# Patient Record
Sex: FEMALE | Race: BLACK OR AFRICAN AMERICAN | Employment: FULL TIME | URBAN - METROPOLITAN AREA
[De-identification: names, ages, dates, MRNs, and addresses within clinical notes are randomized per-mention and may not be internally consistent; named-entity substitution may affect disease eponyms.]

---

## 2019-11-11 ENCOUNTER — OFFICE VISIT (OUTPATIENT)
Dept: SURGERY | Age: 39
End: 2019-11-11

## 2019-11-11 VITALS
HEART RATE: 81 BPM | OXYGEN SATURATION: 99 % | RESPIRATION RATE: 16 BRPM | TEMPERATURE: 97.5 F | SYSTOLIC BLOOD PRESSURE: 132 MMHG | DIASTOLIC BLOOD PRESSURE: 82 MMHG | BODY MASS INDEX: 25.61 KG/M2 | HEIGHT: 64 IN | WEIGHT: 150 LBS

## 2019-11-11 DIAGNOSIS — K64.2 GRADE III HEMORRHOIDS: Primary | ICD-10-CM

## 2019-11-11 RX ORDER — ESCITALOPRAM OXALATE 20 MG/1
TABLET ORAL DAILY
Refills: 0 | COMMUNITY
Start: 2019-10-31

## 2019-11-11 RX ORDER — NORGESTREL AND ETHINYL ESTRADIOL 0.3-0.03MG
KIT ORAL
Refills: 0 | COMMUNITY
Start: 2019-11-07 | End: 2020-01-02

## 2019-11-11 NOTE — LETTER
11/11/19 Patient: Luis Alberto Potts YOB: 1980 Date of Visit: 11/11/2019 Brooks Fenton MD 
[address] [facility] [address] [place] [id number] VIA Facsimile: [phone] Jina Leger MD 
[address] [address] [address] [place] [address] VIA Facsimile: [phone] Dear Johnny Peace, I saw Pati Aguilar in the office today for her hemorrhoids. They prolapse significantly and require manual reduction. On exam she has a very large prolapsed anterior hemorrhoid and a smaller hemorrhoid in the right posterior position. She has been on a fiber powder for 2 to 3 months with minimal benefit. She would like to proceed with hemorrhoidectomy and I think this is reasonable. If you have questions, please do not hesitate to call me. I look forward to following your patient along with you. Sincerely, Bc Quinn MD

## 2019-11-11 NOTE — PROGRESS NOTES
HPI: Helen Hamilton is a 35 y.o. female presenting with chief complain of hemorrhoids. She has had hemorrhoid issues for years. More recently they have prolapse quite significantly they require manual reduction. She sees blood on toilet paper when wiping. She has pain. She had a flex will sigmoidoscopy where one polyp was identified. She has bowel movements every other day and has constipation. She takes Metamucil and has done so over the last 2 to 3 months with some benefit. She denies fecal incontinence but has occasional incontinence to flatus. Past medical history: Negative    Past Surgical History:   Procedure Laterality Date    HX DILATION AND CURETTAGE         Family medical history: Negative for colorectal disorders    Social History     Socioeconomic History    Marital status: SINGLE     Spouse name: Not on file    Number of children: Not on file    Years of education: Not on file    Highest education level: Not on file   Tobacco Use    Smoking status: Current Some Day Smoker    Smokeless tobacco: Never Used   Substance and Sexual Activity    Alcohol use: Yes       Review of Systems - Review of Systems   Constitutional: Positive for diaphoresis and malaise/fatigue. Negative for chills, fever and weight loss. HENT: Positive for congestion. Negative for ear discharge, ear pain, hearing loss, nosebleeds, sinus pain, sore throat and tinnitus. Eyes: Negative. Respiratory: Positive for cough. Negative for hemoptysis, sputum production, shortness of breath, wheezing and stridor. Cardiovascular: Negative. Gastrointestinal: Positive for nausea. Negative for abdominal pain, blood in stool, constipation, diarrhea, heartburn, melena and vomiting. With migraines   Genitourinary: Negative. Musculoskeletal: Negative. Skin: Positive for itching. Negative for rash. rectal   Neurological: Positive for headaches.  Negative for dizziness, tingling, tremors, sensory change, speech change, focal weakness, seizures, loss of consciousness and weakness. Endo/Heme/Allergies: Negative. Psychiatric/Behavioral: Negative. Outpatient Medications Marked as Taking for the 11/11/19 encounter (Office Visit) with Fabrizio Whaley MD   Medication Sig Dispense Refill    escitalopram oxalate (LEXAPRO) 20 mg tablet   0    ELINEST 0.3-30 mg-mcg tab   0       No Known Allergies    Vitals:    11/11/19 1352   Resp: 16   Weight: 68 kg (150 lb)   Height: 5' 4\" (1.626 m)   PainSc:   0 - No pain       Physical Exam   Constitutional: She appears well-developed and well-nourished. HENT:   Head: Normocephalic and atraumatic. Eyes: Conjunctivae and EOM are normal.   Abdominal: Soft. She exhibits no distension. There is no tenderness. Musculoskeletal: Normal range of motion. Lymphadenopathy:     She has no cervical adenopathy. Right: No inguinal adenopathy present. Left: No inguinal adenopathy present. Neurological: No sensory deficit. Skin: Skin is warm and dry. No rash noted. Psychiatric: She has a normal mood and affect. Her speech is normal.   Rectum: Prolapsed anterior hemorrhoid, smaller right posterior external hemorrhoid  Digital rectal exam: Good tone, no mass  Anoscopy: Enlarged internal hemorrhoids in anterior and right posterior positions    Assessment / Plan    Grade 3 hemorrhoids  Schedule hemorrhoidectomy  I told patient that her incontinence to flatus might improve or worsen with surgery and that prolapse can sometimes impede continence  I told her there is a small risk of fecal leakage after surgery  Patient understands these risks and is willing to proceed    The diagnoses and plan were discussed with the patient. All questions answered. Plan of care agreed to by all concerned.

## 2020-01-02 RX ORDER — NORETHINDRONE ACETATE/ETHINYL ESTRADIOL 1MG-20MCG
KIT ORAL
Refills: 1 | COMMUNITY
Start: 2019-10-14

## 2020-01-06 ENCOUNTER — ANESTHESIA EVENT (OUTPATIENT)
Dept: CARDIOTHORACIC SURGERY | Age: 40
End: 2020-01-06
Payer: COMMERCIAL

## 2020-01-07 ENCOUNTER — ANESTHESIA (OUTPATIENT)
Dept: CARDIOTHORACIC SURGERY | Age: 40
End: 2020-01-07
Payer: COMMERCIAL

## 2020-01-07 ENCOUNTER — HOSPITAL ENCOUNTER (OUTPATIENT)
Age: 40
Setting detail: OUTPATIENT SURGERY
Discharge: HOME | End: 2020-01-07
Attending: COLON & RECTAL SURGERY | Admitting: COLON & RECTAL SURGERY
Payer: COMMERCIAL

## 2020-01-07 VITALS
HEART RATE: 62 BPM | TEMPERATURE: 98.1 F | RESPIRATION RATE: 16 BRPM | BODY MASS INDEX: 25.84 KG/M2 | SYSTOLIC BLOOD PRESSURE: 149 MMHG | WEIGHT: 151.38 LBS | DIASTOLIC BLOOD PRESSURE: 88 MMHG | OXYGEN SATURATION: 97 % | HEIGHT: 64 IN

## 2020-01-07 DIAGNOSIS — K64.2 GRADE III HEMORRHOIDS: Primary | ICD-10-CM

## 2020-01-07 LAB
AMPHET UR QL SCN: NEGATIVE
BARBITURATES UR QL SCN: NEGATIVE
BENZODIAZ UR QL: NEGATIVE
CANNABINOIDS UR QL SCN: NEGATIVE
COCAINE UR QL SCN: NEGATIVE
HCG UR QL: NEGATIVE
HCG UR QL: NEGATIVE
HDSCOM,HDSCOM: NORMAL
METHADONE UR QL: NEGATIVE
OPIATES UR QL: NEGATIVE
PCP UR QL: NEGATIVE

## 2020-01-07 PROCEDURE — 88304 TISSUE EXAM BY PATHOLOGIST: CPT

## 2020-01-07 PROCEDURE — 76210000021 HC REC RM PH II 0.5 TO 1 HR: Performed by: COLON & RECTAL SURGERY

## 2020-01-07 PROCEDURE — 77030018836 HC SOL IRR NACL ICUM -A: Performed by: COLON & RECTAL SURGERY

## 2020-01-07 PROCEDURE — 76010000138 HC OR TIME 0.5 TO 1 HR: Performed by: COLON & RECTAL SURGERY

## 2020-01-07 PROCEDURE — 74011000250 HC RX REV CODE- 250: Performed by: NURSE ANESTHETIST, CERTIFIED REGISTERED

## 2020-01-07 PROCEDURE — 76060000032 HC ANESTHESIA 0.5 TO 1 HR: Performed by: COLON & RECTAL SURGERY

## 2020-01-07 PROCEDURE — 74011250636 HC RX REV CODE- 250/636: Performed by: NURSE ANESTHETIST, CERTIFIED REGISTERED

## 2020-01-07 PROCEDURE — 77030040361 HC SLV COMPR DVT MDII -B: Performed by: COLON & RECTAL SURGERY

## 2020-01-07 PROCEDURE — 74011000250 HC RX REV CODE- 250: Performed by: COLON & RECTAL SURGERY

## 2020-01-07 PROCEDURE — 80307 DRUG TEST PRSMV CHEM ANLYZR: CPT

## 2020-01-07 PROCEDURE — 77030040922 HC BLNKT HYPOTHRM STRY -A: Performed by: COLON & RECTAL SURGERY

## 2020-01-07 PROCEDURE — 77030031139 HC SUT VCRL2 J&J -A: Performed by: COLON & RECTAL SURGERY

## 2020-01-07 PROCEDURE — 81025 URINE PREGNANCY TEST: CPT

## 2020-01-07 PROCEDURE — 76210000006 HC OR PH I REC 0.5 TO 1 HR: Performed by: COLON & RECTAL SURGERY

## 2020-01-07 PROCEDURE — 74011250636 HC RX REV CODE- 250/636

## 2020-01-07 PROCEDURE — 74011250637 HC RX REV CODE- 250/637: Performed by: NURSE ANESTHETIST, CERTIFIED REGISTERED

## 2020-01-07 RX ORDER — OXYCODONE AND ACETAMINOPHEN 5; 325 MG/1; MG/1
1 TABLET ORAL
Qty: 40 TAB | Refills: 0 | Status: SHIPPED | OUTPATIENT
Start: 2020-01-07 | End: 2020-01-14

## 2020-01-07 RX ORDER — NALOXONE HYDROCHLORIDE 0.4 MG/ML
0.1 INJECTION, SOLUTION INTRAMUSCULAR; INTRAVENOUS; SUBCUTANEOUS AS NEEDED
Status: DISCONTINUED | OUTPATIENT
Start: 2020-01-07 | End: 2020-01-09 | Stop reason: HOSPADM

## 2020-01-07 RX ORDER — FAMOTIDINE 20 MG/1
20 TABLET, FILM COATED ORAL ONCE
Status: COMPLETED | OUTPATIENT
Start: 2020-01-07 | End: 2020-01-07

## 2020-01-07 RX ORDER — LIDOCAINE HYDROCHLORIDE 10 MG/ML
INJECTION, SOLUTION EPIDURAL; INFILTRATION; INTRACAUDAL; PERINEURAL
Status: DISCONTINUED
Start: 2020-01-07 | End: 2020-01-07 | Stop reason: HOSPADM

## 2020-01-07 RX ORDER — SODIUM CHLORIDE 0.9 % (FLUSH) 0.9 %
5-40 SYRINGE (ML) INJECTION AS NEEDED
Status: DISCONTINUED | OUTPATIENT
Start: 2020-01-07 | End: 2020-01-07 | Stop reason: HOSPADM

## 2020-01-07 RX ORDER — HYDROMORPHONE HYDROCHLORIDE 2 MG/ML
0.5 INJECTION, SOLUTION INTRAMUSCULAR; INTRAVENOUS; SUBCUTANEOUS
Status: DISCONTINUED | OUTPATIENT
Start: 2020-01-07 | End: 2020-01-09 | Stop reason: HOSPADM

## 2020-01-07 RX ORDER — SODIUM CHLORIDE, SODIUM LACTATE, POTASSIUM CHLORIDE, CALCIUM CHLORIDE 600; 310; 30; 20 MG/100ML; MG/100ML; MG/100ML; MG/100ML
25 INJECTION, SOLUTION INTRAVENOUS CONTINUOUS
Status: DISCONTINUED | OUTPATIENT
Start: 2020-01-07 | End: 2020-01-07 | Stop reason: HOSPADM

## 2020-01-07 RX ORDER — FENTANYL CITRATE 50 UG/ML
50 INJECTION, SOLUTION INTRAMUSCULAR; INTRAVENOUS AS NEEDED
Status: DISCONTINUED | OUTPATIENT
Start: 2020-01-07 | End: 2020-01-09 | Stop reason: HOSPADM

## 2020-01-07 RX ORDER — LIDOCAINE HYDROCHLORIDE 10 MG/ML
0.1 INJECTION, SOLUTION EPIDURAL; INFILTRATION; INTRACAUDAL; PERINEURAL AS NEEDED
Status: DISCONTINUED | OUTPATIENT
Start: 2020-01-07 | End: 2020-01-07 | Stop reason: HOSPADM

## 2020-01-07 RX ORDER — LIDOCAINE HYDROCHLORIDE 20 MG/ML
INJECTION, SOLUTION EPIDURAL; INFILTRATION; INTRACAUDAL; PERINEURAL AS NEEDED
Status: DISCONTINUED | OUTPATIENT
Start: 2020-01-07 | End: 2020-01-07 | Stop reason: HOSPADM

## 2020-01-07 RX ORDER — DIPHENHYDRAMINE HYDROCHLORIDE 50 MG/ML
12.5 INJECTION, SOLUTION INTRAMUSCULAR; INTRAVENOUS
Status: DISCONTINUED | OUTPATIENT
Start: 2020-01-07 | End: 2020-01-09 | Stop reason: HOSPADM

## 2020-01-07 RX ORDER — SODIUM CHLORIDE 0.9 % (FLUSH) 0.9 %
5-40 SYRINGE (ML) INJECTION EVERY 8 HOURS
Status: DISCONTINUED | OUTPATIENT
Start: 2020-01-07 | End: 2020-01-07 | Stop reason: HOSPADM

## 2020-01-07 RX ORDER — ONDANSETRON 2 MG/ML
INJECTION INTRAMUSCULAR; INTRAVENOUS
Status: COMPLETED
Start: 2020-01-07 | End: 2020-01-07

## 2020-01-07 RX ORDER — ONDANSETRON 2 MG/ML
4 INJECTION INTRAMUSCULAR; INTRAVENOUS ONCE
Status: COMPLETED | OUTPATIENT
Start: 2020-01-07 | End: 2020-01-07

## 2020-01-07 RX ORDER — SODIUM CHLORIDE 0.9 % (FLUSH) 0.9 %
5-40 SYRINGE (ML) INJECTION EVERY 8 HOURS
Status: DISCONTINUED | OUTPATIENT
Start: 2020-01-07 | End: 2020-01-09 | Stop reason: HOSPADM

## 2020-01-07 RX ORDER — SODIUM CHLORIDE 0.9 % (FLUSH) 0.9 %
5-40 SYRINGE (ML) INJECTION AS NEEDED
Status: DISCONTINUED | OUTPATIENT
Start: 2020-01-07 | End: 2020-01-09 | Stop reason: HOSPADM

## 2020-01-07 RX ORDER — KETAMINE HCL 50MG/ML(1)
SYRINGE (ML) INTRAVENOUS AS NEEDED
Status: DISCONTINUED | OUTPATIENT
Start: 2020-01-07 | End: 2020-01-07 | Stop reason: HOSPADM

## 2020-01-07 RX ORDER — SODIUM CHLORIDE, SODIUM LACTATE, POTASSIUM CHLORIDE, CALCIUM CHLORIDE 600; 310; 30; 20 MG/100ML; MG/100ML; MG/100ML; MG/100ML
INJECTION, SOLUTION INTRAVENOUS
Status: DISCONTINUED | OUTPATIENT
Start: 2020-01-07 | End: 2020-01-07 | Stop reason: HOSPADM

## 2020-01-07 RX ORDER — MIDAZOLAM HYDROCHLORIDE 1 MG/ML
INJECTION, SOLUTION INTRAMUSCULAR; INTRAVENOUS AS NEEDED
Status: DISCONTINUED | OUTPATIENT
Start: 2020-01-07 | End: 2020-01-07 | Stop reason: HOSPADM

## 2020-01-07 RX ORDER — SODIUM CHLORIDE, SODIUM LACTATE, POTASSIUM CHLORIDE, CALCIUM CHLORIDE 600; 310; 30; 20 MG/100ML; MG/100ML; MG/100ML; MG/100ML
50 INJECTION, SOLUTION INTRAVENOUS CONTINUOUS
Status: DISCONTINUED | OUTPATIENT
Start: 2020-01-07 | End: 2020-01-09 | Stop reason: HOSPADM

## 2020-01-07 RX ORDER — PROPOFOL 10 MG/ML
VIAL (ML) INTRAVENOUS
Status: DISCONTINUED | OUTPATIENT
Start: 2020-01-07 | End: 2020-01-07 | Stop reason: HOSPADM

## 2020-01-07 RX ORDER — FENTANYL CITRATE 50 UG/ML
INJECTION, SOLUTION INTRAMUSCULAR; INTRAVENOUS AS NEEDED
Status: DISCONTINUED | OUTPATIENT
Start: 2020-01-07 | End: 2020-01-07 | Stop reason: HOSPADM

## 2020-01-07 RX ORDER — BACITRACIN ZINC 500 UNIT/G
OINTMENT (GRAM) TOPICAL
Status: DISCONTINUED
Start: 2020-01-07 | End: 2020-01-07 | Stop reason: HOSPADM

## 2020-01-07 RX ADMIN — HYDROMORPHONE HYDROCHLORIDE 0.5 MG: 2 INJECTION, SOLUTION INTRAMUSCULAR; INTRAVENOUS; SUBCUTANEOUS at 10:32

## 2020-01-07 RX ADMIN — HYDROMORPHONE HYDROCHLORIDE 0.5 MG: 2 INJECTION, SOLUTION INTRAMUSCULAR; INTRAVENOUS; SUBCUTANEOUS at 10:42

## 2020-01-07 RX ADMIN — Medication 25 MG: at 09:41

## 2020-01-07 RX ADMIN — ONDANSETRON 4 MG: 2 INJECTION INTRAMUSCULAR; INTRAVENOUS at 10:59

## 2020-01-07 RX ADMIN — LIDOCAINE HYDROCHLORIDE 100 MG: 20 INJECTION, SOLUTION INTRAVENOUS at 09:32

## 2020-01-07 RX ADMIN — Medication 25 MG: at 09:28

## 2020-01-07 RX ADMIN — SODIUM CHLORIDE, SODIUM LACTATE, POTASSIUM CHLORIDE, AND CALCIUM CHLORIDE 25 ML/HR: 600; 310; 30; 20 INJECTION, SOLUTION INTRAVENOUS at 08:46

## 2020-01-07 RX ADMIN — FENTANYL CITRATE 100 MCG: 50 INJECTION, SOLUTION INTRAMUSCULAR; INTRAVENOUS at 09:23

## 2020-01-07 RX ADMIN — SODIUM CHLORIDE, SODIUM LACTATE, POTASSIUM CHLORIDE, AND CALCIUM CHLORIDE: 600; 310; 30; 20 INJECTION, SOLUTION INTRAVENOUS at 09:23

## 2020-01-07 RX ADMIN — FAMOTIDINE 20 MG: 20 TABLET, FILM COATED ORAL at 08:46

## 2020-01-07 RX ADMIN — PROPOFOL 25 MCG/KG/MIN: 10 INJECTION, EMULSION INTRAVENOUS at 09:32

## 2020-01-07 RX ADMIN — MIDAZOLAM HYDROCHLORIDE 2 MG: 2 INJECTION, SOLUTION INTRAMUSCULAR; INTRAVENOUS at 09:23

## 2020-01-07 NOTE — ANESTHESIA POSTPROCEDURE EVALUATION
Procedure(s): HEMORRHOIDECTOMY. MAC, general - backup    Anesthesia Post Evaluation      Multimodal analgesia: multimodal analgesia used between 6 hours prior to anesthesia start to PACU discharge  Patient location during evaluation: bedside  Patient participation: complete - patient participated  Level of consciousness: awake  Pain management: adequate  Airway patency: patent  Anesthetic complications: no  Cardiovascular status: stable  Respiratory status: acceptable  Hydration status: acceptable  Post anesthesia nausea and vomiting:  controlled      Vitals Value Taken Time   /96 1/7/2020 10:48 AM   Temp 36.8 °C (98.2 °F) 1/7/2020 10:26 AM   Pulse 58 1/7/2020 10:58 AM   Resp 12 1/7/2020 10:58 AM   SpO2 99 % 1/7/2020 10:58 AM   Vitals shown include unvalidated device data.

## 2020-01-07 NOTE — ANESTHESIA PREPROCEDURE EVALUATION
Relevant Problems   No relevant active problems       Anesthetic History   No history of anesthetic complications            Review of Systems / Medical History  Patient summary reviewed and pertinent labs reviewed    Pulmonary  Within defined limits                 Neuro/Psych   Within defined limits           Cardiovascular  Within defined limits                Exercise tolerance: >4 METS     GI/Hepatic/Renal  Within defined limits              Endo/Other  Within defined limits           Other Findings   Comments:                  Physical Exam    Airway  Mallampati: II  TM Distance: 4 - 6 cm  Neck ROM: normal range of motion   Mouth opening: Normal     Cardiovascular  Regular rate and rhythm,  S1 and S2 normal,  no murmur, click, rub, or gallop  Rhythm: regular  Rate: normal         Dental  No notable dental hx       Pulmonary  Breath sounds clear to auscultation               Abdominal  GI exam deferred       Other Findings            Anesthetic Plan    ASA: 1  Anesthesia type: MAC and general - backup          Induction: Intravenous  Anesthetic plan and risks discussed with: Patient

## 2020-01-07 NOTE — BRIEF OP NOTE
BRIEF OPERATIVE NOTE    Date of Procedure: 1/7/2020   Preoperative Diagnosis: K64.2 GRADE III HEMORRHOIDS  Postoperative Diagnosis: K64.2 GRADE III HEMORRHOIDS    Procedure(s):   HEMORRHOIDECTOMY, 3 quadrant  Surgeon(s) and Role:     * Dayan Booth MD - Primary         Surgical Assistant: none    Surgical Staff:  Circ-1: Maximus Tate RN  Scrub Tech-1: Eileen Garvey  Surg Asst-1: Nyasia Tiwari  Event Time In Time Out   Incision Start 7983    Incision Close 1006      Anesthesia: MAC   Estimated Blood Loss: 10 ml  Specimens:   ID Type Source Tests Collected by Time Destination   1 : anterior hemorrhoid Preservative Tiny Booth MD 1/7/2020 1082 Pathology   2 : left lateral hemorrhoid Preservative Tiny Booth MD 1/7/2020 1021 Pathology   3 : right posterior hemorrhoid Preservative Tiny Booth MD 1/7/2020 3882 Pathology      Findings: hemorrhoids   Complications: none  Implants: * No implants in log *    [id number]

## 2020-01-07 NOTE — H&P
HPI: Chirag Castle is a 35 y.o. female presenting with chief complain of hemorrhoids. Past Medical History:   Diagnosis Date    History of seasonal allergies        Past Surgical History:   Procedure Laterality Date    FLEXIBLE SIGMOIDOSCOPY  05/30/2019    Dr. Mirta Riojas         History reviewed. No pertinent family history. Social History     Socioeconomic History    Marital status: SINGLE     Spouse name: Not on file    Number of children: Not on file    Years of education: Not on file    Highest education level: Not on file   Tobacco Use    Smoking status: Current Some Day Smoker     Packs/day: 0.25     Years: 2.00     Pack years: 0.50    Smokeless tobacco: Never Used   Substance and Sexual Activity    Alcohol use: Yes     Comment: oc    Drug use: Not Currently     Types: Marijuana     Comment: in the past       Review of Systems - neg    Outpatient Medications Marked as Taking for the 1/7/20 encounter [facility] Encounter)   Medication Sig Dispense Refill    LUIS 1/20, 21, 1-20 mg-mcg tab take 1 tablet by mouth once daily  1    ferrous sulfate (IRON PO) Take  by mouth.  escitalopram oxalate (LEXAPRO) 20 mg tablet daily. 0       No Known Allergies    Vitals:    01/02/20 0851 01/07/20 0803 01/07/20 0834 01/07/20 0838   BP:    149/90   Pulse:    65   Resp:    18   Temp:    98.8 °F (37.1 °C)   SpO2:    100%   Weight: 68 kg (150 lb) 68.7 kg (151 lb 6 oz)     Height: 5' 4\" (1.626 m)  5' 4\" (1.626 m)        Physical Exam  Constitutional:       Appearance: She is well-developed. HENT:      Head: Normocephalic and atraumatic. Eyes:      Conjunctiva/sclera: Conjunctivae normal.   Abdominal:      General: There is no distension. Palpations: Abdomen is soft. Tenderness: There is no tenderness. Musculoskeletal: Normal range of motion. Lymphadenopathy:      Cervical: No cervical adenopathy. Skin:     General: Skin is warm and dry. Findings: No rash. Neurological:      Sensory: No sensory deficit. Psychiatric:         Speech: Speech normal.         Assessment / Plan    hemorrhoidectomy    The diagnoses and plan were discussed with the patient. All questions answered. Plan of care agreed to by all concerned.

## 2020-01-07 NOTE — DISCHARGE INSTRUCTIONS
Instructions After Hemorrhoidectomy    A packing was placed. It will fall out on its own and that is OK. Apply dry dressings on your bottom as necessary. Please take 4 dulcolax tablets tonight  Please take 2 tablespoons of mineral oil daily for 5 days starting tomorrow  Then STOP mineral oil and start metamucil daily  Sitz baths for comfort  Percocet for pain  Avoid Aspirin or ibuprofen (Advil, Aleve, Motrin), Tylenol is OK  Please call the office if you have not had a bowel movement in 3 days. DISCHARGE SUMMARY from Nurse    PATIENT INSTRUCTIONS:    After general anesthesia or intravenous sedation, for 24 hours or while taking prescription Narcotics:  · Limit your activities  · Do not drive and operate hazardous machinery  · Do not make important personal or business decisions  · Do  not drink alcoholic beverages  · If you have not urinated within 8 hours after discharge, please contact your surgeon on call. Report the following to your surgeon:  · Excessive pain, swelling, redness or odor of or around the surgical area  · Temperature over 100.5  · Nausea and vomiting lasting longer than 4 hours or if unable to take medications  · Any signs of decreased circulation or nerve impairment to extremity: change in color, persistent  numbness, tingling, coldness or increase pain  · Any questions    *  Please give a list of your current medications to your Primary Care Provider. *  Please update this list whenever your medications are discontinued, doses are      changed, or new medications (including over-the-counter products) are added. *  Please carry medication information at all times in case of emergency situations. These are general instructions for a healthy lifestyle:    No smoking/ No tobacco products/ Avoid exposure to second hand smoke  Surgeon General's Warning:  Quitting smoking now greatly reduces serious risk to your health.     Obesity, smoking, and sedentary lifestyle greatly increases your risk for illness    A healthy diet, regular physical exercise & weight monitoring are important for maintaining a healthy lifestyle    You may be retaining fluid if you have a history of heart failure or if you experience any of the following symptoms:  Weight gain of 3 pounds or more overnight or 5 pounds in a week, increased swelling in our hands or feet or shortness of breath while lying flat in bed. Please call your doctor as soon as you notice any of these symptoms; do not wait until your next office visit. The discharge information has been reviewed with the patient and spouse. The patient and spouse verbalized understanding. Discharge medications reviewed with the patient and spouse and appropriate educational materials and side effects teaching were provided.   ___________________________________________________________________________________________________________________________________

## 2020-01-08 NOTE — OP NOTES
[address] [place] [address]   OPERATIVE REPORT    Name:  Mira Landry  MR#:   [id number]  :  1980  ACCOUNT #:  [de-identified]  DATE OF SERVICE:  2020    PREOPERATIVE DIAGNOSIS:  Grade III hemorrhoids. POSTOPERATIVE DIAGNOSIS:  Grade III hemorrhoids. PROCEDURE PERFORMED:  Hemorrhoidectomy, three-quadrant. SURGEON:  Neetu Stewart MD.    ASSISTANT:  None. ANESTHESIA:  MAC.    COMPLICATIONS:  None. SPECIMENS REMOVED:  Hemorrhoids to pathology. IMPLANTS:  None. ESTIMATED BLOOD LOSS:  10 mL. CONDITION:  Good. FINDINGS:  Hemorrhoids. INDICATIONS:  The patient is a 34-year-old woman with hemorrhoids. She was brought to the operating room for hemorrhoidectomy. I explained the risks including bleeding, infection, incontinence, and recurrence. She understood and wished to proceed. PROCEDURE:  The patient was properly identified in the holding area and brought to the operating room. She was placed in the prone jackknife position. Sedation was administered by Anesthesia. Buttocks were taped apart. Perianal area was prepped and draped in the usual sterile fashion. Digital rectal exam was performed and was normal aside from some very large anterior hemorrhoids. These were prolapsing in nature. These were excised with Metzenbaum scissors down to the pedicle and suture ligated with 2-0 Vicryl suture. The defect was then closed with a running 3-0 Vicryl suture. There were small hemorrhoids on the right posterior and left lateral region. These were excised with Metzenbaum scissors and suture ligated with a 3-0 Vicryl suture at their pedicles. Hemostasis was assured with cautery. The rectum was irrigated. Gelfoam packing was placed. Dry sterile dressings were applied. The patient tolerated the procedure well. All instrument, sponge, and needle counts were correct at the end of the case x2.   The patient awoke from anesthesia and was transported to the PACU in stable condition.       MD BYRON Whitman/[email or web address]/  D:  01/07/2020 10:14  T:  01/07/2020 19:45  JOB #:  [id number]

## 2020-01-27 ENCOUNTER — OFFICE VISIT (OUTPATIENT)
Dept: SURGERY | Age: 40
End: 2020-01-27

## 2020-01-27 VITALS
OXYGEN SATURATION: 98 % | RESPIRATION RATE: 16 BRPM | SYSTOLIC BLOOD PRESSURE: 144 MMHG | DIASTOLIC BLOOD PRESSURE: 91 MMHG | TEMPERATURE: 98.1 F | WEIGHT: 148 LBS | BODY MASS INDEX: 25.27 KG/M2 | HEART RATE: 78 BPM | HEIGHT: 64 IN

## 2020-01-27 DIAGNOSIS — K64.2 GRADE III HEMORRHOIDS: Primary | ICD-10-CM

## 2020-01-27 NOTE — PROGRESS NOTES
Robin Morrison is a 35 y.o. female (: 1980) presenting to address:    Chief Complaint   Patient presents with    Post OP Follow Up       Medication list and allergies have been reviewed with Robin Morrison and updated as of today's date. I have gone over all Medical, Surgical and Social History with Robin Morrison and updated/added the information accordingly. 1. Have you been to the ER, Urgent Care or Hospitalized since your last visit? NO      2. Have you followed up with your PCP or any other Physicians since your procedure/ last office visit?    NO

## 2020-01-27 NOTE — LETTER
NOTIFICATION RETURN TO WORK / SCHOOL 
 
1/27/2020 10:48 AM 
 
Ms. Harrison Dasilva [address] [place] [address] To Whom It May Concern: Harrison Dasilva is currently under the care of Srinivas Gonzalez Dr. She will return to work 1/28/20 with no restrictions. If there are questions or concerns please have the patient contact our office. Sincerely, Manuel Lama MD

## 2020-01-27 NOTE — PROGRESS NOTES
Subjective: Recovering from surgery. Still pain with defecation. Moving bowels. Taking fiber powder. Feels some urgency. Past medical history and ROS were reviewed and unchanged. Rectum: No open external wounds  Digital rectal exam: Good tone, no mass, no stenosis    Assessment / Plan    Status post hemorrhoidectomy  Decrease to half dose fiber powder daily for 6 months  Told to follow-up with gastroenterologist if bleeding persists in the next few months to consider for colonoscopy    The diagnoses and plan were discussed with patient. All questions answered. Plan of care agreed to by all concerned.

## 2022-07-08 ENCOUNTER — TELEPHONE (OUTPATIENT)
Dept: SURGERY | Age: 42
End: 2022-07-08

## (undated) DEVICE — KIT CLN UP BON SECOURS MARYV

## (undated) DEVICE — SHEET, T, LAPAROTOMY, STERILE: Brand: MEDLINE

## (undated) DEVICE — INTENDED FOR TISSUE SEPARATION, AND OTHER PROCEDURES THAT REQUIRE A SHARP SURGICAL BLADE TO PUNCTURE OR CUT.: Brand: BARD-PARKER ® STAINLESS STEEL BLADES

## (undated) DEVICE — BLANKET WRM AD W50XL85.8IN PACU FULL BODY FORC AIR

## (undated) DEVICE — SUTURE VCRL SZ 2-0 L27IN ABSRB UD L26MM SH 1/2 CIR J417H

## (undated) DEVICE — PACK PROCEDURE SURG MAJ W/ BASIN LF

## (undated) DEVICE — SOLUTION IV 1000ML 0.9% SOD CHL

## (undated) DEVICE — GARMENT,MEDLINE,DVT,SEQUENTIAL,CALF,MD: Brand: MEDLINE

## (undated) DEVICE — REM POLYHESIVE ADULT PATIENT RETURN ELECTRODE: Brand: VALLEYLAB

## (undated) DEVICE — 3M™ MEDIPORE™ H SOFT CLOTH SURGICAL TAPE 2862, 2 INCH X 10 YARD (5CM X 9,1M), 12 ROLLS/CASE: Brand: 3M™ MEDIPORE™

## (undated) DEVICE — FLEX ADVANTAGE 3000CC: Brand: FLEX ADVANTAGE

## (undated) DEVICE — SYR 10ML CTRL LR LCK NSAF LF --

## (undated) DEVICE — STERILE POLYISOPRENE POWDER-FREE SURGICAL GLOVES: Brand: PROTEXIS

## (undated) DEVICE — GAUZE,SPONGE,4"X4",16PLY,STRL,LF,10/TRAY: Brand: MEDLINE

## (undated) DEVICE — SUTURE VCRL SZ 3-0 L27IN ABSRB UD L26MM SH 1/2 CIR J416H

## (undated) DEVICE — PREP SKN POV IOD 10% SOL 4OZ --

## (undated) DEVICE — TAPE,CLOTH/SILK,CURAD,3"X10YD,LF,40/CS: Brand: CURAD

## (undated) DEVICE — POSITIONER HD REST FOAM CMFRT TCH

## (undated) DEVICE — PAD,ABDOMINAL,8"X10",ST,LF: Brand: MEDLINE